# Patient Record
Sex: FEMALE | URBAN - METROPOLITAN AREA
[De-identification: names, ages, dates, MRNs, and addresses within clinical notes are randomized per-mention and may not be internally consistent; named-entity substitution may affect disease eponyms.]

---
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
## 2025-01-06 NOTE — PROGRESS NOTES
SUBJECTIVE:                                                   Howard Jaquez is a 19 year old female who presents to clinic today for the following health issue(s):  Patient presents with:  Establish Care        HPI:  Howard here to discuss birth control options. Has never been on birth control. Is sexually active, using condoms and withdrawal.  Accepts STI screening. No unprotected sex in the past 2 weeks.     Periods are Q 12 days, lasting 11 days. Periods are heavy.     Denies history of migraines, DVT, MI, stroke.     Goes to school in [place], is on the dance team.     Patient's last menstrual period was 2025..   Patient is sexually active, .  Using none for contraception.    reports that she has never smoked. She has never used smokeless tobacco.  STD testing offered?  Accepted  Health maintenance updated:  yes    Today's PHQ-2 Score:       2025     3:11 PM   PHQ-2 (  [employer])   Q1: Little interest or pleasure in doing things 0   Q2: Feeling down, depressed or hopeless 0   PHQ-2 Score 0    Q1: Little interest or pleasure in doing things Not at all   Q2: Feeling down, depressed or hopeless Not at all   PHQ-2 Score 0       Patient-reported     Today's PHQ-9 Score:       2025     3:10 PM   PHQ-9 SCORE   PHQ-9 Total Score MyChart 1 (Minimal depression)   PHQ-9 Total Score 1        Patient-reported     Today's NAYELI-7 Score:       2025     3:11 PM   NAYELI-7 SCORE   Total Score 0 (minimal anxiety)   Total Score 0        Patient-reported       Problem list and histories reviewed & adjusted, as indicated.  Additional history: as documented.    There is no problem list on file for this patient.    History reviewed. No pertinent surgical history.   Social History     Tobacco Use    Smoking status: Never    Smokeless tobacco: Never   Substance Use Topics    Alcohol use: Not on file      Problem (# of Occurrences) Relation (Name,Age of Onset)    Hypertension (1) Father              No current  "outpatient medications on file.     No current facility-administered medications for this visit.     No Known Allergies        OBJECTIVE:     /78   Ht 1.727 m (5' 8\")   Wt 74.4 kg (164 lb)   LMP 01/01/2025   BMI 24.94 kg/m    Body mass index is 24.94 kg/m .    Exam:  Constitutional:  Appearance: Well nourished, well developed alert, in no acute distress  Psychiatric:  Mentation appears normal and affect normal/bright.     In-Clinic Test Results:  No results found for this or any previous visit (from the past 24 hours).    ASSESSMENT/PLAN:                                                        ICD-10-CM    1. Routine screening for STI (sexually transmitted infection)  Z11.3 Chlamydia trachomatis/Neisseria gonorrhoeae by PCR      2. OCP (oral contraceptive pills) initiation  Z30.011 HCG qualitative urine          There are no Patient Instructions on file for this visit.      I reviewed with the patient options for contraception today.  We discussed the birth control pill, the patch, the NuvaRing, Depo-Provera, Nexplanon, and the Paraguard, Mirena, Daria, and Kyleena IUDs.  We reviewed the risks, benefits, side effects, failure rates, and potential complications of each of these options for birth control.   We reviewed the insertion process of the Nexplanon and IUDs.  We reviewed the effectiveness of each of the options.  We discussed that none of these protects against STI's. All questions and concerns addressed.   After our discussion patient has decided on OCP    -sent in OCP  -UPT today  -G/C sent    Ailyn Braga PA-C  [facility] [place]    Answers submitted by the patient for this visit:  Patient Health Questionnaire (Submitted on 1/7/2025)  PHQ9 TOTAL SCORE: 1  Patient Health Questionnaire (G7) (Submitted on 1/7/2025)  NAYELI 7 TOTAL SCORE: 0    "

## 2025-01-07 ENCOUNTER — OFFICE VISIT (OUTPATIENT)
Dept: OBGYN | Facility: CLINIC | Age: 20
End: 2025-01-07
Payer: COMMERCIAL

## 2025-01-07 VITALS
SYSTOLIC BLOOD PRESSURE: 112 MMHG | WEIGHT: 164 LBS | BODY MASS INDEX: 24.86 KG/M2 | DIASTOLIC BLOOD PRESSURE: 78 MMHG | HEIGHT: 68 IN

## 2025-01-07 DIAGNOSIS — Z11.3 ROUTINE SCREENING FOR STI (SEXUALLY TRANSMITTED INFECTION): Primary | ICD-10-CM

## 2025-01-07 DIAGNOSIS — Z30.011 OCP (ORAL CONTRACEPTIVE PILLS) INITIATION: ICD-10-CM

## 2025-01-07 LAB — HCG UR QL: NEGATIVE

## 2025-01-07 PROCEDURE — 81025 URINE PREGNANCY TEST: CPT

## 2025-01-07 PROCEDURE — 87591 N.GONORRHOEAE DNA AMP PROB: CPT

## 2025-01-07 PROCEDURE — 87491 CHLMYD TRACH DNA AMP PROBE: CPT

## 2025-01-07 PROCEDURE — 99203 OFFICE O/P NEW LOW 30 MIN: CPT

## 2025-01-07 RX ORDER — LEVONORGESTREL/ETHIN.ESTRADIOL 0.1-0.02MG
1 TABLET ORAL DAILY
Qty: 112 TABLET | Refills: 4 | Status: SHIPPED | OUTPATIENT
Start: 2025-01-07

## 2025-01-07 ASSESSMENT — ANXIETY QUESTIONNAIRES
1. FEELING NERVOUS, ANXIOUS, OR ON EDGE: NOT AT ALL
GAD7 TOTAL SCORE: 0
3. WORRYING TOO MUCH ABOUT DIFFERENT THINGS: NOT AT ALL
7. FEELING AFRAID AS IF SOMETHING AWFUL MIGHT HAPPEN: NOT AT ALL
4. TROUBLE RELAXING: NOT AT ALL
2. NOT BEING ABLE TO STOP OR CONTROL WORRYING: NOT AT ALL
5. BEING SO RESTLESS THAT IT IS HARD TO SIT STILL: NOT AT ALL
6. BECOMING EASILY ANNOYED OR IRRITABLE: NOT AT ALL
7. FEELING AFRAID AS IF SOMETHING AWFUL MIGHT HAPPEN: NOT AT ALL
GAD7 TOTAL SCORE: 0
GAD7 TOTAL SCORE: 0

## 2025-01-07 ASSESSMENT — PATIENT HEALTH QUESTIONNAIRE - PHQ9
SUM OF ALL RESPONSES TO PHQ QUESTIONS 1-9: 1
SUM OF ALL RESPONSES TO PHQ QUESTIONS 1-9: 1

## 2025-01-08 LAB
C TRACH DNA SPEC QL PROBE+SIG AMP: NEGATIVE
N GONORRHOEA DNA SPEC QL NAA+PROBE: NEGATIVE
SPECIMEN TYPE: NORMAL

## 2025-03-09 ENCOUNTER — HEALTH MAINTENANCE LETTER (OUTPATIENT)
Age: 20
End: 2025-03-09

## 2025-07-25 NOTE — PROGRESS NOTES
SUBJECTIVE:                                                   Howard Jaquez is a 20 year old female who presents to clinic today for the following health issue(s):  Patient presents with:  Consult: Patient is in for birth control consult. Patient is currently on the pill and has been getting period 40 days at a time some months and having intense cramping. Has been on the pill since Jan. Reg periods have been prolonged in the past.       HPI:  Howard is here today to discuss birth control options. She has been on Aviane OCP since January for irregular periods and reports breakthrough bleeding, the longest lasting 40 days. Taking 3 weeks on and one placebo week. She also endorses cramping, increased cravings which she did not have prior to starting birth control. Denies any tobacco use, history of DVT, MI, stroke or migraines with aura.     Patient's last menstrual period was 07/13/2025..     Using oral contraceptives for contraception.    reports that she has never smoked. She has never used smokeless tobacco.    STD testing offered?  Declined    Health maintenance updated:  yes    Today's PHQ-2 Score:       1/7/2025     3:11 PM   PHQ-2 ( 1999 [employer])   Q1: Little interest or pleasure in doing things 0   Q2: Feeling down, depressed or hopeless 0   PHQ-2 Score 0    Q1: Little interest or pleasure in doing things Not at all   Q2: Feeling down, depressed or hopeless Not at all   PHQ-2 Score 0       Patient-reported     Today's PHQ-9 Score:       1/7/2025     3:10 PM   PHQ-9 SCORE   PHQ-9 Total Score MyChart 1 (Minimal depression)   PHQ-9 Total Score 1        Patient-reported     Today's NAYELI-7 Score:       1/7/2025     3:11 PM   NAYELI-7 SCORE   Total Score 0 (minimal anxiety)   Total Score 0        Patient-reported       Problem list and histories reviewed & adjusted, as indicated.  Additional history: as documented.    There is no problem list on file for this patient.    History reviewed. No pertinent surgical  history.   Social History     Tobacco Use    Smoking status: Never    Smokeless tobacco: Never   Substance Use Topics    Alcohol use: Yes     Comment: socially      Problem (# of Occurrences) Relation (Name,Age of Onset)    Hypertension (1) Father              Current Outpatient Medications   Medication Sig Dispense Refill    levonorgestrel-ethinyl estradiol (AVIANE) 0.1-20 MG-MCG tablet Take 1 tablet by mouth daily. 112 tablet 4    norgestrel-ethinyl estradiol (LO/OVRAL) 0.3-30 MG-MCG tablet Take 1 tablet by mouth daily. 84 tablet 4     No current facility-administered medications for this visit.     No Known Allergies      OBJECTIVE:     /70   Wt 76.2 kg (168 lb)   LMP 07/13/2025   BMI 25.54 kg/m    Body mass index is 25.54 kg/m .    Exam:  Constitutional:  Appearance: Well nourished, well developed alert, in no acute distress  Psychiatric:  Mentation appears normal and affect normal/bright.     In-Clinic Test Results:  No results found for this or any previous visit (from the past 24 hours).    ASSESSMENT/PLAN:                                                        ICD-10-CM    1. OCP (oral contraceptive pills) initiation  Z30.011 norgestrel-ethinyl estradiol (LO/OVRAL) 0.3-30 MG-MCG tablet      2. Breakthrough bleeding on OCPs  N92.1 norgestrel-ethinyl estradiol (LO/OVRAL) 0.3-30 MG-MCG tablet          There are no Patient Instructions on file for this visit.    - Plan to start Ovral for irregular periods . RX sent    MARGARITA Bishop [facility] [place]

## 2025-07-29 ENCOUNTER — OFFICE VISIT (OUTPATIENT)
Dept: OBGYN | Facility: CLINIC | Age: 20
End: 2025-07-29
Payer: COMMERCIAL

## 2025-07-29 VITALS — DIASTOLIC BLOOD PRESSURE: 70 MMHG | BODY MASS INDEX: 25.54 KG/M2 | SYSTOLIC BLOOD PRESSURE: 110 MMHG | WEIGHT: 168 LBS

## 2025-07-29 DIAGNOSIS — N92.1 BREAKTHROUGH BLEEDING ON OCPS: ICD-10-CM

## 2025-07-29 DIAGNOSIS — Z30.011 OCP (ORAL CONTRACEPTIVE PILLS) INITIATION: Primary | ICD-10-CM

## 2025-07-29 PROCEDURE — 3074F SYST BP LT 130 MM HG: CPT

## 2025-07-29 PROCEDURE — 3078F DIAST BP <80 MM HG: CPT

## 2025-07-29 PROCEDURE — 99212 OFFICE O/P EST SF 10 MIN: CPT
